# Patient Record
Sex: MALE | Race: WHITE | NOT HISPANIC OR LATINO | URBAN - METROPOLITAN AREA
[De-identification: names, ages, dates, MRNs, and addresses within clinical notes are randomized per-mention and may not be internally consistent; named-entity substitution may affect disease eponyms.]

---

## 2017-01-06 ENCOUNTER — COMMUNICATION - HEALTHEAST (OUTPATIENT)
Dept: ONCOLOGY | Facility: HOSPITAL | Age: 77
End: 2017-01-06

## 2017-01-30 ENCOUNTER — RECORDS - HEALTHEAST (OUTPATIENT)
Dept: ADMINISTRATIVE | Facility: OTHER | Age: 77
End: 2017-01-30

## 2017-09-06 ASSESSMENT — MIFFLIN-ST. JEOR: SCORE: 1563.11

## 2017-09-07 ASSESSMENT — MIFFLIN-ST. JEOR: SCORE: 1574

## 2017-09-15 ENCOUNTER — ANESTHESIA - HEALTHEAST (OUTPATIENT)
Dept: SURGERY | Facility: CLINIC | Age: 77
End: 2017-09-15

## 2017-09-15 ENCOUNTER — OFFICE VISIT - HEALTHEAST (OUTPATIENT)
Dept: NEUROSURGERY | Facility: CLINIC | Age: 77
End: 2017-09-15

## 2017-09-15 ENCOUNTER — SURGERY - HEALTHEAST (OUTPATIENT)
Dept: SURGERY | Facility: CLINIC | Age: 77
End: 2017-09-15

## 2017-09-15 DIAGNOSIS — S12.9XXA CERVICAL SPINE FRACTURE (H): ICD-10-CM

## 2017-09-19 ENCOUNTER — COMMUNICATION - HEALTHEAST (OUTPATIENT)
Dept: NEUROSURGERY | Facility: CLINIC | Age: 77
End: 2017-09-19

## 2017-09-26 ENCOUNTER — AMBULATORY - HEALTHEAST (OUTPATIENT)
Dept: NEUROSURGERY | Facility: CLINIC | Age: 77
End: 2017-09-26

## 2017-09-26 DIAGNOSIS — M54.2 NECK PAIN: ICD-10-CM

## 2017-10-17 ENCOUNTER — COMMUNICATION - HEALTHEAST (OUTPATIENT)
Dept: NEUROSURGERY | Facility: CLINIC | Age: 77
End: 2017-10-17

## 2017-10-17 ENCOUNTER — HOSPITAL ENCOUNTER (OUTPATIENT)
Dept: RADIOLOGY | Facility: CLINIC | Age: 77
Discharge: HOME | End: 2017-10-17
Attending: NEUROLOGICAL SURGERY

## 2017-10-17 ENCOUNTER — OFFICE VISIT - HEALTHEAST (OUTPATIENT)
Dept: NEUROSURGERY | Facility: CLINIC | Age: 77
End: 2017-10-17

## 2017-10-17 DIAGNOSIS — S12.500A C6 CERVICAL FRACTURE (H): ICD-10-CM

## 2017-10-17 DIAGNOSIS — M54.2 NECK PAIN: ICD-10-CM

## 2017-12-12 ENCOUNTER — COMMUNICATION - HEALTHEAST (OUTPATIENT)
Dept: NEUROSURGERY | Facility: CLINIC | Age: 77
End: 2017-12-12

## 2017-12-12 ENCOUNTER — OFFICE VISIT - HEALTHEAST (OUTPATIENT)
Dept: NEUROSURGERY | Facility: CLINIC | Age: 77
End: 2017-12-12

## 2017-12-12 DIAGNOSIS — S12.9XXA CERVICAL SPINE FRACTURE (H): ICD-10-CM

## 2018-01-23 ENCOUNTER — RECORDS - HEALTHEAST (OUTPATIENT)
Dept: LAB | Facility: CLINIC | Age: 78
End: 2018-01-23

## 2018-01-23 LAB
ALBUMIN UR-MCNC: NEGATIVE MG/DL
APPEARANCE UR: CLEAR
BILIRUB UR QL STRIP: NEGATIVE
COLOR UR AUTO: YELLOW
GLUCOSE UR STRIP-MCNC: NEGATIVE MG/DL
HGB UR QL STRIP: NEGATIVE
KETONES UR STRIP-MCNC: NEGATIVE MG/DL
LEUKOCYTE ESTERASE UR QL STRIP: NEGATIVE
NITRATE UR QL: NEGATIVE
PH UR STRIP: 6.5 [PH] (ref 4.5–8)
SP GR UR STRIP: 1.02 (ref 1–1.03)
UROBILINOGEN UR STRIP-ACNC: NORMAL

## 2021-05-24 ENCOUNTER — RECORDS - HEALTHEAST (OUTPATIENT)
Dept: ADMINISTRATIVE | Facility: CLINIC | Age: 81
End: 2021-05-24

## 2021-05-31 ENCOUNTER — RECORDS - HEALTHEAST (OUTPATIENT)
Dept: ADMINISTRATIVE | Facility: CLINIC | Age: 81
End: 2021-05-31

## 2021-05-31 VITALS — HEIGHT: 67 IN | BODY MASS INDEX: 31.5 KG/M2 | WEIGHT: 200.7 LBS

## 2021-06-02 ENCOUNTER — RECORDS - HEALTHEAST (OUTPATIENT)
Dept: ADMINISTRATIVE | Facility: CLINIC | Age: 81
End: 2021-06-02

## 2021-06-03 ENCOUNTER — RECORDS - HEALTHEAST (OUTPATIENT)
Dept: ADMINISTRATIVE | Facility: CLINIC | Age: 81
End: 2021-06-03

## 2021-06-05 ENCOUNTER — RECORDS - HEALTHEAST (OUTPATIENT)
Dept: NEUROLOGY | Facility: CLINIC | Age: 81
End: 2021-06-05

## 2021-06-05 DIAGNOSIS — G20.A1 PARALYSIS AGITANS (H): ICD-10-CM

## 2021-06-13 NOTE — ANESTHESIA CARE TRANSFER NOTE
Last vitals:   Vitals:    09/15/17 1825   BP: 155/74   Pulse: 86   Resp: 18   Temp: 36.8  C (98.2  F)   SpO2: 99%     Patient's level of consciousness is drowsy  Spontaneous respirations: yes  Maintains airway independently: yes  Dentition unchanged: yes  Oropharynx: oropharynx clear of all foreign objects    QCDR Measures:  ASA# 20 - Surgical Safety Checklist: WHO surgical safety checklist completed prior to induction  PQRS# 430 - Adult PONV Prevention: 4558F - Pt received => 2 anti-emetic agents (different classes) preop & intraop  ASA# 8 - Peds PONV Prevention: NA - Not pediatric patient, not GA or 2 or more risk factors NOT present  PQRS# 424 - Kelsie-op Temp Management: 4559F - At least one body temp DOCUMENTED => 35.5C or 95.9F within required timeframe  PQRS# 426 - PACU Transfer Protocol: - Transfer of care checklist used  ASA# 14 - Acute Post-op Pain: ASA14B - Patient did NOT experience pain >= 7 out of 10

## 2021-06-13 NOTE — PROGRESS NOTES
CHART NOTE     DATE OF SERVICE:  10/17/2017     : 1940   76 y.o.     ASSESSMENT :   Doing well, hardware stable.  In hospice, will not push collar, may discontinue for comfort.  Follow up if family desires.     PLAN:  Collar as tolerated.  RTC 8 weeks with CT if so desired, or may cancel if patient doing well. Plan d/w Dr. Vogel.     HPI:  Ludin Hernández is status post ACDF at C6-7 on 9/15/2017 by Dr. Vogel.  Preoperatively presented with C6 fracture, subsequent to fall (chronic balance problems due to supranuclear palsy).  Failed HALO treatment.  To [facility].      TODAY, Ludin Hernández reports min neck pain.  No arm pain. Resides in [facility], now [facility]. Walking some with walker, hospice implemented 2 weeks ago. Not likely to pursue further surgical intervention in future. Son states that collar is off most of the time in the care center.      PAST MEDICAL HISTORY, SURGICAL HISTORY, REVIEW OF SYMPTOMS, MEDICATIONS AND ALLERGIES:  Past medical history, surgical history, ROS, medications and allergies reviewed with patient and remain unchanged from previous visit.    Past Medical History:   Diagnosis Date     History of enucleation of right eyeball      Lymphoma      MGUS (monoclonal gammopathy of unknown significance)      Prosthetic eye globe     Right     PSP (progressive supranuclear palsy)        PHYSICAL EXAM:      Neurological exam reveals:  Respirations easy, non-labored.   Skin: W/D/I. No rashes, lesions or breaks in integrity.   Recent and remote memory deficit, fund of knowledge deficit   Alert,  speech minimal, follows commands appropriately  PERRL, EOMI, No nystagmus,   Face symmetric, tongue midline, Uvula midline,  palate rises with phonation   Shoulder shrug equal  Arm strength bilateral grasp, biceps, triceps, and deltoids 5/5 to exam  Leg strength bilateral dorsiflexion, plantar flexion, and hip flexion 5/5 to exam  No extremity edema noted.    Muscle Bulk and tone wnl.   Gait and station:deferred, in WC   Incision well healed.   NDI/RAMESH: 35%     RADIOGRAPHIC IMAGING: XR:  Poor visualization but hardware appears to be intact without loosening.   Films personally reviewed and interpreted. Reviewed imaging with patient and family.

## 2021-06-13 NOTE — PROGRESS NOTES
Ludin Hernández is status post ACDF at C6-7 on 9/15/2017 by Dr. Vogel.  Preoperatively presented with C6 fracture, failed HALO treatment.  Today he is here with his son for a wound check. Denies any pain or discomfort. In a wheelchair.  In [facility] collar. Resides at [facility].       Surgical wound WNL - CDI, no signs of infection or skin breakdown.  Incision well-healed: good skin approximation, no redness or visible/palpable edema, no tenderness to palpation.  PT. AF, denies fever, chills or sweats.  Pt. reports that the symptoms are improved from pre-op.

## 2021-06-13 NOTE — ANESTHESIA POSTPROCEDURE EVALUATION
Patient: Ludin Hernández  Anterior cervical 6-7 diskectomy and fusion  Anesthesia type: general    Patient location: Telemetry/Step Down Unit  Last vitals:   Vitals:    09/16/17 0703   BP: 102/45   Pulse: 82   Resp: 20   Temp: 36.8  C (98.3  F)   SpO2: 90%     Post vital signs: stable  Level of consciousness: awake and responds to simple questions  Post-anesthesia pain: pain controlled  Post-anesthesia nausea and vomiting: no  Pulmonary: unassisted, return to baseline  Cardiovascular: stable and blood pressure at baseline  Hydration: adequate  Anesthetic events: no    QCDR Measures:  ASA# 11 - Kelsie-op Cardiac Arrest: ASA11B - Patient did NOT experience unanticipated cardiac arrest  ASA# 12 - Kelsie-op Mortality Rate: ASA12B - Patient did NOT die  ASA# 13 - PACU Re-Intubation Rate: ASA13B - Patient did NOT require a new airway mgmt  ASA# 10 - Composite Anes Safety: ASA10A - No serious adverse event    Additional Notes:

## 2021-06-13 NOTE — ANESTHESIA PREPROCEDURE EVALUATION
Anesthesia Evaluation      Patient summary reviewed   No history of anesthetic complications     Airway   Neck ROM: limited  Comment: Obese, C-collar, difficult exaM   Pulmonary - negative ROS and normal exam    breath sounds clear to auscultation                         Cardiovascular - negative ROS and normal exam  Exercise tolerance: > or = 4 METS  (-) murmur  ECG reviewed  Rhythm: regular  Rate: normal,    no murmur      Neuro/Psych    (+) neuromuscular disease (Peripheral neuropathy),      Comments: C6 cervical fracture  Progressive supranuclear palsy    Endo/Other    (+) obesity (BMI 31),      Comments: Lymphoma  MGUS (monoclonal gammopathy of unknown significance)  Prosthetic right eye    GI/Hepatic/Renal - negative ROS      Other findings:  XR CHEST PA AND LATERAL  9/14/2017 6:32 PM     INDICATION: preop with pulm symp/hx/signs  COMPARISON: CTA chest 9/7/2017.     FINDINGS: Somewhat low inspiratory lung volumes but the lungs appear clear. Normal heart size and pulmonary vascularity. Mild calcified plaque of the aorta.       09/13/17 1123 MR Cervical Spine Without Contrast View Image  Impression:   CONCLUSION:  1. Moderate to severe motion artifact on most sequences makes evaluation difficult.    2. Focal discontinuity of the ligamentum flavum at the C6 level best appreciated sagittal T2-weighted images likely represents a partial tear. Additionally, there is prevertebral hematoma from C4-C6 and the anterior longitudinal ligament appears slightly  anteriorly displaced. It may be partially disrupted/stretched.            Dental    (+) upper dentures                       Anesthesia Plan  Planned anesthetic: general endotracheal  Neutral intubation  Glidescope, prn fiberoptic  Preop robinul, lidocaine neb  Precedex  Prn arterial line  ASA 3   Induction: intravenous   Anesthetic plan and risks discussed with: patient and child/children (Son)  Anesthesia plan special considerations: video-assisted, rapid sequence  induction, increased risk of difficult airway, antiemetics, dexmedetomidine  Post-op plan: routine recovery  DNR/DNI status   DNR/DNI status discussed with patient (Son).  Plan is for suspension of DNR

## 2021-06-13 NOTE — PROGRESS NOTES
CHART NOTE     DATE OF SERVICE:  9/15/2017     : 1940   76 y.o.     ASSESSMENT :       PLAN: .    HPI:  Ludin KLINE Hernández is status post      TODAY, Ludin KLINE Hernández  cancelled appt, due to  Hospitalization.

## 2021-06-14 NOTE — PROGRESS NOTES
CHART NOTE     DATE OF SERVICE:  2017     : 1940   76 y.o.     ASSESSMENT :  Family Cxl sumit. Patient in hospice.      PLAN: RTC prn/    HPI:  Ludin Hernández is status post ACDF at C6-7 on 9/15/2017 by Dr. Vogel.  Preoperatively presented with C6 fracture, subsequent to fall (chronic balance problems due to supranuclear palsy).  Failed HALO treatment.  To [facility] 17 (now [facility]).      Last seen in clinic on 10/17/2017.  Patient reported min neck pain.  No arm pain, some N/T.  Walking with walker. Hospice implemented 2 weeks ago. Not likely to pursue further surgical intervention in future. Son states that collar is off most of the time in the care center. XR: Hardware intact.  PLAN: Doing well, hardware stable.  In hospice, will not push collar, may discontinue for comfort.  Follow up if family desires. RTC 8 weeks with CT.     TODAY, Ludin Hernández 's appt was cancelled. Patient in hospice.

## 2021-06-16 PROBLEM — S12.500A C6 CERVICAL FRACTURE (H): Status: ACTIVE | Noted: 2017-09-06

## 2021-06-16 PROBLEM — J96.01 ACUTE RESPIRATORY FAILURE WITH HYPOXIA (H): Status: ACTIVE | Noted: 2017-09-07

## 2021-06-16 PROBLEM — G45.0 VERTEBROBASILAR INSUFFICIENCY: Chronic | Status: ACTIVE | Noted: 2017-09-07

## 2021-06-16 PROBLEM — T14.8XXA LIGAMENT TEAR: Status: ACTIVE | Noted: 2017-09-06

## 2021-06-16 PROBLEM — S12.9XXA CERVICAL SPINE FRACTURE (H): Status: ACTIVE | Noted: 2017-09-06

## 2021-06-16 PROBLEM — R00.0 TACHYCARDIA: Status: ACTIVE | Noted: 2017-09-07

## 2021-06-16 PROBLEM — R13.10 SWALLOWING IMPAIRED: Status: ACTIVE | Noted: 2017-09-07

## 2021-06-16 PROBLEM — G23.1 PROGRESSIVE SUPRANUCLEAR PALSY (H): Status: ACTIVE | Noted: 2017-09-06
